# Patient Record
Sex: MALE | Race: OTHER
[De-identification: names, ages, dates, MRNs, and addresses within clinical notes are randomized per-mention and may not be internally consistent; named-entity substitution may affect disease eponyms.]

---

## 2020-07-11 ENCOUNTER — HOSPITAL ENCOUNTER (EMERGENCY)
Dept: HOSPITAL 15 - ER | Age: 35
Discharge: HOME | End: 2020-07-11
Payer: MEDICAID

## 2020-07-11 VITALS — SYSTOLIC BLOOD PRESSURE: 131 MMHG | DIASTOLIC BLOOD PRESSURE: 82 MMHG

## 2020-07-11 VITALS — WEIGHT: 180 LBS | BODY MASS INDEX: 26.66 KG/M2 | HEIGHT: 69 IN

## 2020-07-11 DIAGNOSIS — J06.9: ICD-10-CM

## 2020-07-11 DIAGNOSIS — U07.1: Primary | ICD-10-CM

## 2020-07-11 PROCEDURE — 71045 X-RAY EXAM CHEST 1 VIEW: CPT

## 2020-07-11 PROCEDURE — 87635 SARS-COV-2 COVID-19 AMP PRB: CPT

## 2021-05-03 ENCOUNTER — HOSPITAL ENCOUNTER (EMERGENCY)
Dept: HOSPITAL 26 - MED | Age: 36
Discharge: HOME | End: 2021-05-03
Payer: COMMERCIAL

## 2021-05-03 VITALS — WEIGHT: 185 LBS | HEIGHT: 68 IN | BODY MASS INDEX: 28.04 KG/M2

## 2021-05-03 VITALS — DIASTOLIC BLOOD PRESSURE: 68 MMHG | SYSTOLIC BLOOD PRESSURE: 124 MMHG

## 2021-05-03 VITALS — DIASTOLIC BLOOD PRESSURE: 89 MMHG | SYSTOLIC BLOOD PRESSURE: 114 MMHG

## 2021-05-03 DIAGNOSIS — R11.2: ICD-10-CM

## 2021-05-03 DIAGNOSIS — R19.7: ICD-10-CM

## 2021-05-03 DIAGNOSIS — F11.23: Primary | ICD-10-CM

## 2021-05-03 DIAGNOSIS — R50.9: ICD-10-CM

## 2021-05-03 DIAGNOSIS — F17.210: ICD-10-CM

## 2021-05-03 LAB
BARBITURATES UR QL SCN: NEGATIVE NG/ML
BENZODIAZ UR QL SCN: NEGATIVE NG/ML
BZE UR QL SCN: NEGATIVE NG/ML
CANNABINOIDS UR QL SCN: NEGATIVE NG/ML
OPIATES UR QL SCN: NEGATIVE NG/ML
PCP UR QL SCN: NEGATIVE NG/ML

## 2021-05-03 PROCEDURE — 96361 HYDRATE IV INFUSION ADD-ON: CPT

## 2021-05-03 PROCEDURE — 96374 THER/PROPH/DIAG INJ IV PUSH: CPT

## 2021-05-03 PROCEDURE — 80305 DRUG TEST PRSMV DIR OPT OBS: CPT

## 2021-05-03 PROCEDURE — 99283 EMERGENCY DEPT VISIT LOW MDM: CPT

## 2021-05-03 NOTE — NUR
Patient discharged with v/s stable. Written and verbal after care instructions 
given and explained. 

Patient alert, oriented and verbalized understanding of instructions. 
Ambulatory with steady gait. All questions addressed prior to discharge. ID 
band removed. Patient advised to follow up with PMD. Rx of MOTRIN, IMODIUM, 
ZOFRAN given. Patient educated on indication of medication including possible 
reaction and side effects. Opportunity to ask questions provided and answered.

## 2025-01-08 ENCOUNTER — HOSPITAL ENCOUNTER (INPATIENT)
Dept: HOSPITAL 15 - ER | Age: 40
LOS: 1 days | Discharge: HOME | DRG: 812 | End: 2025-01-09
Attending: INTERNAL MEDICINE | Admitting: INTERNAL MEDICINE
Payer: MEDICAID

## 2025-01-08 VITALS — HEIGHT: 70 IN | WEIGHT: 260.59 LBS | BODY MASS INDEX: 37.31 KG/M2

## 2025-01-08 DIAGNOSIS — B19.20: ICD-10-CM

## 2025-01-08 DIAGNOSIS — I10: ICD-10-CM

## 2025-01-08 DIAGNOSIS — Z20.822: ICD-10-CM

## 2025-01-08 DIAGNOSIS — R74.01: ICD-10-CM

## 2025-01-08 DIAGNOSIS — J96.01: ICD-10-CM

## 2025-01-08 DIAGNOSIS — E78.5: ICD-10-CM

## 2025-01-08 DIAGNOSIS — N17.0: ICD-10-CM

## 2025-01-08 DIAGNOSIS — I21.A1: ICD-10-CM

## 2025-01-08 DIAGNOSIS — K71.9: ICD-10-CM

## 2025-01-08 DIAGNOSIS — G92.8: ICD-10-CM

## 2025-01-08 DIAGNOSIS — E72.20: ICD-10-CM
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
## 2025-01-08 DIAGNOSIS — G93.89: ICD-10-CM

## 2025-01-08 DIAGNOSIS — E11.9: ICD-10-CM

## 2025-01-08 DIAGNOSIS — Z86.718: ICD-10-CM

## 2025-01-08 DIAGNOSIS — T40.411A: Primary | ICD-10-CM

## 2025-01-08 LAB
ALBUMIN SERPL-MCNC: 4.8 G/DL (ref 3.2–4.8)
ALP SERPL-CCNC: 70 U/L (ref 46–116)
ALT SERPL-CCNC: 60 U/L (ref 7–40)
ANION GAP SERPL CALCULATED.3IONS-SCNC: 9 MMOL/L (ref 5–15)
APAP SERPL-MCNC: < 2 UG/ML (ref 10–20)
BILIRUB SERPL-MCNC: 0.2 MG/DL (ref 0.2–1)
BUN SERPL-MCNC: 9 MG/DL (ref 9–23)
BUN/CREAT SERPL: 6.6 (ref 10–20)
CALCIUM SERPL-MCNC: 9.3 MG/DL (ref 8.7–10.4)
CELLS COUNTED: 100
CHLORIDE SERPL-SCNC: 103 MMOL/L (ref 98–107)
CO2 SERPL-SCNC: 25 MMOL/L (ref 20–31)
GLUCOSE SERPL-MCNC: 158 MG/DL (ref 74–106)
HCT VFR BLD AUTO: 41 % (ref 41–53)
HGB BLD-MCNC: 13.6 G/DL (ref 13.5–17.5)
MCH RBC QN AUTO: 28.8 PG (ref 28–32)
MCV RBC AUTO: 86.7 FL (ref 80–100)
POTASSIUM SERPL-SCNC: 3.9 MMOL/L (ref 3.5–5.1)
PROT SERPL-MCNC: 7.8 G/DL (ref 5.7–8.2)
SALICYLATES SERPL-MCNC: < 3 MG/DL (ref ?–30)
SODIUM SERPL-SCNC: 137 MMOL/L (ref 136–145)

## 2025-01-08 PROCEDURE — 71250 CT THORAX DX C-: CPT

## 2025-01-08 PROCEDURE — 86803 HEPATITIS C AB TEST: CPT

## 2025-01-08 PROCEDURE — 80307 DRUG TEST PRSMV CHEM ANLYZR: CPT

## 2025-01-08 PROCEDURE — 87426 SARSCOV CORONAVIRUS AG IA: CPT

## 2025-01-08 PROCEDURE — 80329 ANALGESICS NON-OPIOID 1 OR 2: CPT

## 2025-01-08 PROCEDURE — 93005 ELECTROCARDIOGRAM TRACING: CPT

## 2025-01-08 PROCEDURE — 85610 PROTHROMBIN TIME: CPT

## 2025-01-08 PROCEDURE — 80061 LIPID PANEL: CPT

## 2025-01-08 PROCEDURE — 85027 COMPLETE CBC AUTOMATED: CPT

## 2025-01-08 PROCEDURE — 83036 HEMOGLOBIN GLYCOSYLATED A1C: CPT

## 2025-01-08 PROCEDURE — 71045 X-RAY EXAM CHEST 1 VIEW: CPT

## 2025-01-08 PROCEDURE — 99291 CRITICAL CARE FIRST HOUR: CPT

## 2025-01-08 PROCEDURE — 85025 COMPLETE CBC W/AUTO DIFF WBC: CPT

## 2025-01-08 PROCEDURE — 83605 ASSAY OF LACTIC ACID: CPT

## 2025-01-08 PROCEDURE — 80320 DRUG SCREEN QUANTALCOHOLS: CPT

## 2025-01-08 PROCEDURE — 86706 HEP B SURFACE ANTIBODY: CPT

## 2025-01-08 PROCEDURE — 81001 URINALYSIS AUTO W/SCOPE: CPT

## 2025-01-08 PROCEDURE — 36415 COLL VENOUS BLD VENIPUNCTURE: CPT

## 2025-01-08 PROCEDURE — 82140 ASSAY OF AMMONIA: CPT

## 2025-01-08 PROCEDURE — 86703 HIV-1/HIV-2 1 RESULT ANTBDY: CPT

## 2025-01-08 PROCEDURE — 83880 ASSAY OF NATRIURETIC PEPTIDE: CPT

## 2025-01-08 PROCEDURE — 84484 ASSAY OF TROPONIN QUANT: CPT

## 2025-01-08 PROCEDURE — 80053 COMPREHEN METABOLIC PANEL: CPT

## 2025-01-08 PROCEDURE — 82962 GLUCOSE BLOOD TEST: CPT

## 2025-01-08 PROCEDURE — 85007 BL SMEAR W/DIFF WBC COUNT: CPT

## 2025-01-08 PROCEDURE — 86709 HEPATITIS A IGM ANTIBODY: CPT

## 2025-01-08 PROCEDURE — 96374 THER/PROPH/DIAG INJ IV PUSH: CPT

## 2025-01-08 PROCEDURE — 87340 HEPATITIS B SURFACE AG IA: CPT

## 2025-01-08 PROCEDURE — 76705 ECHO EXAM OF ABDOMEN: CPT

## 2025-01-08 PROCEDURE — 87804 INFLUENZA ASSAY W/OPTIC: CPT

## 2025-01-08 RX ADMIN — LORAZEPAM ONE MG: 2 INJECTION, SOLUTION INTRAMUSCULAR; INTRAVENOUS at 22:15

## 2025-01-08 RX ADMIN — LORAZEPAM ONE MG: 2 INJECTION, SOLUTION INTRAMUSCULAR; INTRAVENOUS at 22:17

## 2025-01-08 RX ADMIN — NALOXONE HYDROCHLORIDE ONE MG: 1 INJECTION PARENTERAL at 22:25

## 2025-01-08 NOTE — DVH
CHEST RADIOGRAPH



Indication: hypoxia s/p od



Technique: Single frontal view of the chest was obtained



Comparison: CHEST XRAY 1 VIEW on DOS: 7/11/20



FINDINGS: 



Lines and Tubes: None



Lungs: Clear



Pleura: No effusion.



No pneumothorax. 



Cardiomediastinal contours: Unremarkable



Bones: Unremarkable



IMPRESSION: 



Clear lungs. 



Electronically Signed by: Tiago Baptiste at 01/08/2025 22:47:34 PM

## 2025-01-08 NOTE — ECG
[facility]

                                       

Test Date:    2025               Test Time:    21:52:00

Pat Name:     DAVID GIRON           Department:   ER

Patient ID:   [id number]           Room:         [address] [place] [address]

Gender:       M                        Technician:   ER

:          1985               Requested By: LIZET PARK

Order Number: [id number]        Reading MD:   Baudilio Mccullough

                                 Measurements

Intervals                              Axis          

Rate:         116                      P:            68

NJ:           184                      QRS:          71

QRSD:         97                       T:            25

QT:           307                                    

QTc:          427                                    

                           Interpretive Statements

Sinus tachycardia

Ventricular premature complex

Aberrant conduction of SV complex(es)

Baseline wander in lead(s) V2

Electronically Signed On 2025 10:27:37 PST by Baudilio Mccullough



Please click the below link to view image of tracing.

## 2025-01-08 NOTE — ED.PDOC
History of Present Illness


HPI Comments


39 year old male brought in by EMS presents to the ED with a chief complaint of 

overdose onset today. Per EMS, patient was found in the bathroom floor, 

unconscious with a foil containing fentanyl. Upon EMS arrival patient was 

experiencing respiratory depression, pupils pinpoint, was given 4 mg Narcan, was

placed on 15 L non breather with O2 sat of 91%, , . Patient's 

sibling was also found unresponsive on scene. Patient is able to answer a few 

questions. Past medical history of seizures, HTN, DM, DVT. No other symptoms or 

modifying factors present at this time.  Patient denied any intent to hurt 

himself.


Chief Complaint:  Overdose


Time Seen by MD:  21:54


Primary Care Provider:  NONE


Reviewed Notes:  Nurses Notes, Medications, Allergies


Allergies:  


Coded Allergies:  


     NO KNOWN ALLERGIES (Unverified , 25)


Information Source:  Patient, Emergency Med Personnel


Mode of Arrival:  EMS


Severity:  Moderate


Timing:  Hours


Duration:  Since onset


Prehospital treatment:  Oxygen, Other (Narcan 4 mg)





Past Medical History


PAST MEDICAL HISTORY:  DM, HTN, Seizures


Past Medical History (Other):  


DVT


Surgical History:  Denies all surgeries





Social History


Smoker:  Non-Smoker


Alcohol:  Denies ETOH Use


Drugs:  Other





Neurological:  reports: others (overdose)


Unable to Obtain due to:  Altered Mental Status





Physical Exam


General Appearance:  Other (Lethargic)


HEENT:  Other (Pupils symmetric, extraocular movements intact, moist mucous 

membranes, no facial asymmetry)


Neck:  Full Range of Motion, Non-Tender, Normal Inspection, Supple


Respiratory:  Decreased Breath Sounds, No Accessory Muscle Use, No Respiratory 

Distress, Normal Breath Sounds, Other (Shallow respirations)


Cardiovascular:  No Edema, No JVD, Tachycardia


Breast Exam:  Deferred


Gastrointestinal:  Non Tender, Soft


Genitalia:  Deferred


Pelvic:  Deferred


Rectal:  Deferred


Extremities:  Normal inspection, Normal range of motion, Non-tender, No pedal 

edema


Neurologic:  Other (Lethargic, becomes agitated when alert, oriented x4 when 

alert.  Moves all extremities.  No gross focal deficit.)


Cerebellar Function:  NOT DONE


Reflexes:  NOT DONE


Skin:  Dry, Normal Color, Warm


Lymphatic:  NOT DONE





Was a procedure done?


Was a procedure done?:  No





EKG


EKG :  


Comments


Sinus tach, rate 116, normal intervals, normal axis, normal QRS, no ST/T 

changes.





Differential Dx


Considerations may include:


Opiate overdose, seizure, syncope, alcohol intoxication, other drug 

intoxication, among others





X-Ray, Labs, Meds, VS





                                   Vital Signs








  Date Time  Temp Pulse Resp B/P (MAP) Pulse Ox O2 Delivery O2 Flow Rate FiO2


 


25 00:00  120      


 


25 23:10  111 12 140/88 (105) 95   


 


25 22:58      Nasal Cannula* 5 40


 


25 22:08  124 18 140/88 (105) 95   


 


25 21:55 99.2 117 10 150/104 (119) 91   


 


25 21:52  116      








                                       Lab








Test


 25


01:03 25


22:54 25


22:10 Range/Units


 


 


Troponin I High Sensitivity 112 *H 85 *H 74 *H </=54  ng/L


 


Triglycerides Level 134    < 150  mg/dL


 


Cholesterol Level 133    < 200  mg/dL


 


LDL Cholesterol 65    < 100  mg/dL


 


HDL Cholesterol 44    40-59  mg/dL


 


White Blood Count


 


 


 15.4 H


 4.4-10.8


10^3/uL


 


Red Blood Count


 


 


 4.73 


 4.5-5.90


10^6/uL


 


Hemoglobin   13.6  13.5-17.5  g/dL


 


Hematocrit   41.0  41.0-53.0  %


 


Mean Corpuscular Volume   86.7  80.0-100.0  fL


 


Mean Corpuscular Hemoglobin   28.8  28.0-32.0  pg


 


Mean Corpuscular Hemoglobin


Concent 


 


 33.3 


 32.0-36.0  g/dL





 


Red Cell Distribution Width   14.7 H 11.8-14.3  %


 


Platelet Count


 


 


 292 


 140-450


10^3/uL


 


Mean Platelet Volume   7.7  6.9-10.8  fL


 


Neutrophils (%) (Auto)     37.0-80.0  %


 


Lymphocytes (%) (Auto)     10.0-50.0  %


 


Monocytes (%) (Auto)     0.0-12.0  %


 


Basophils (%) (Auto)     0.0-2.0  %


 


Neutrophils # (Auto)


 


 


  


 1.6-8.6  10


^3/uL


 


Lymphocytes # (Auto)


 


 


  


 0.4-5.4  10


^3/uL


 


Monocytes # (Auto)     0-1.3  10 ^3/uL


 


Differential Total Cells


Counted 


 


 100.0 


 100  





 


Neutrophils % (Manual)   82 H 37.0-80.0  


 


Band Neutrophils % (Manual)   9   


 


Lymphocytes % (Manual)   6 L 10.0-50.0  


 


Monocytes % (Manual)   3  0-12  


 


Eosinophils % (Manual)   0  0-7  


 


Basophils % (Manual)   0  0.0-2.0  


 


Metamyelocytes % (manual)   0   


 


Myelocytes % (Manual)   0   


 


Promyelocytes % (Manual)   0   


 


Blast Cells % (Manual)   0   


 


Reactive Lymphocytes   0   


 


Platelet Estimate   Adequate   


 


Sodium Level   137  136-145  mmol/L


 


Potassium Level   3.9  3.5-5.1  mmol/L


 


Chloride Level   103    mmol/L


 


Carbon Dioxide Level   25  20-31  mmol/L


 


Anion Gap   9  5-15  


 


Blood Urea Nitrogen   9  9-23  mg/dL


 


Creatinine


 


 


 1.37 H


 0.700-1.30


mg/dL


 


Glomerular Filtration Rate


Calc 


 


 68 


 >90  mL/min





 


BUN/Creatinine Ratio   6.6 L 10.0-20.0  


 


Serum Glucose   158 H   mg/dL


 


Hemoglobin A1c   6.0 H <5.7  % A1C


 


Calcium Level   9.3  8.7-10.4  mg/dL


 


Total Bilirubin   0.2  0.2-1.0  mg/dL


 


Aspartate Amino Transferase


(AST) 


 


 272 H


 13-40  U/L





 


Alanine Aminotransferase (ALT)   60 H 7-40  U/L


 


Alkaline Phosphatase   70    U/L


 


B-Type Natriuretic Peptide   15.96  0-100  pg/mL


 


Total Protein   7.8  5.7-8.2  g/dL


 


Albumin   4.8  3.2-4.8  g/dL


 


Salicylates Level   < 3.0  -30  mg/dL


 


Acetaminophen Level


 


 


 < 2.0 L


 10.0-20.0


UG/ML


 


Plasma/Serum Blood Alcohol   4.9  <10  mg/dL








                               Current Medications








 Medications


  (Trade)  Dose


 Ordered  Sig/Mayela


 Route  Start Time


 Stop Time Status Last Admin


 


 Lorazepam


  (Ativan Inj)  1 mg  ONCE  ONCE


 IV  25 22:15


 25 22:38 DC 25 22:15





 


 Sodium Chloride  1,000 ml @ 


 100 mls/hr  Q10H ONCE


 IV  25 01:00


 25 10:59  25 00:53























                             [address] [address]


                              Ph: [phone]





                               DIAGNOSTIC IMAGING


                      Diagnostic Imaging Report : [id number]


                                     Signed








PATIENT: DAVID GRION   ACCT: [id number]        UNIT: [id number]


: 1985           LOC: ER                   ROOM / BED:  / 


AGE / SEX: 39 / M         ADM STATUS: REG ER        SERVICE DT: 25





ORDERING PHYSICIAN: LIZET LEVI MD


PROCEDURE(s): CXRP - CHEST PORTABLE


REASON: hypoxia s/p od


ORDER NUMBER(s): [id number], ACCESSION NUMBER(s): [id number]








CHEST RADIOGRAPH





Indication: hypoxia s/p od





Technique: Single frontal view of the chest was obtained





Comparison: CHEST XRAY 1 VIEW on DOS: 20





FINDINGS: 





Lines and Tubes: None





Lungs: Clear





Pleura: No effusion.





No pneumothorax. 





Cardiomediastinal contours: Unremarkable





Bones: Unremarkable





IMPRESSION: 





Clear lungs. 





Electronically Signed by: Lj Martínez at 2025 22:47:34 PM











DICTATED BY: LJ MARTÍNEZ DO


DICTATED DATE/TIME: 25





SIGNED BY: LJ MARTÍNEZ DO


SIGNED DATE/TIME: 25





CC: 





X-Ray, Labs, Meds, VS Comment


39-year-old male with a history of hypertension, seizure disorder, VTE and 

diabetes brought in by EMS after being found unconscious with shallow 

respirations and pinpoint pupils.  Patient awakened with IN Narcan 

administration by EMS


Initial vitals remarkable for heart rate 116, /104, respirations 10, 

oxygen saturation 91% on 6 L mask 


Exam remarkable for lethargy, shallow respirations,  no focal neurologic deficit


Rhythm strip independently interpreted by me:  Sinus tach, rate 116, no ectopy. 




Chest x-ray unremarkable 


CBC remarkable for WBC 15.4, CMP remarkable for creatinine 1.37, mild 

transaminitis, Tylenol and salicylate levels negative, alcohol level negative, 

urine drug screen and UA pending 


Troponin elevated at 73.  BNP normal 


Patient treated with the following in the ED:


Narcan 1 mg IV for recurrent respiratory depression, Ativan 1 mg IV for 

agitation after Narcan administration


On re-evaluation, patient resting comfortably with vitals stable.  No 

respiratory depression.  Oxygen saturation normal on 6 L mask.  Patient denied 

any chest pain.  


Plan is to admit the patient for serial troponins, Cardiology evaluation, and 

respiratory support as needed.


Time of 1ST Reevaluation:  22:24


Reevaluation 1ST:  Unchanged


Patient Education/Counseling:  Diagnosis, Treatment, Prognosis


Family Education/Counseling:  No Family Present


Additional Information


I reviewed the following notes from patient's past medical encounters:





The following tests were ordered, and results were reviewed by me: EKG, TROP -

x3, CBC, CMP, DRUG SCREEN, ACETAMINOPHEN, SALICYLATE, UA, XY CHEST, BLOOD 

ALCOHOL, MANUAL DIFFERENTIAL





Additional Information was gathered from interviewing the following independent 

historians: EMS





I reviewed and agreed with the following test results read by other providers:  

XY CHEST





I discussed treatment and results with medical personnel and: patient





Departure 1


Departure


Time of Disposition:  01:33


Impression:  


   Primary Impression:  


   Accidental fentanyl overdose


   Qualified Codes:  T40.411A - Poisoning by fentanyl or fentanyl analogs, 

   accidental (unintentional), initial encounter


   Additional Impression:  


   Elevated troponin


Disposition:   ADMITTED AS INPATIENT


Admit to:  [facility]


Condition:  Guarded





Critical Care Note


Critical Care Time?:  Yes (45 min-critical care time only)


Critical care comment:


Critical care time including multiple bedside re-evaluations, review of lab and 

imaging studies, and discussion of the case with the admitting provider.  

Patient is high risk for hemodynamic and/or respiratory decompensation.





Stability


Stability form required:  No





Heart Score


Heart Score:  








Heart Score Response (Comments) Value


 


History N/A 0


 


EKG N/A 0


 


Age N/A 0


 


Risk Factors N/A 0


 


Troponin N/A 0


 


Total  0














I personally scribed for LIZET LEVI MD ([email or web address]) on 25 at 

22:19.  Electronically submitted by Sherlyn Ramirez ([email or web address]).


I personally scribed for LIZET LEVI MD ([email or web address]) on 25 at 

22:27.  Electronically submitted by Sherlyn Ramirez ([email or web address]).


I personally scribed for LIZET LEVI MD ([email or web address]) on 25 at 

23:00.  Electronically submitted by Sherlyn Ramirez ([email or web address]).





LIZET LEVI MD     2025 22:19

## 2025-01-09 VITALS — OXYGEN SATURATION: 98 % | HEART RATE: 111 BPM | RESPIRATION RATE: 22 BRPM

## 2025-01-09 VITALS
OXYGEN SATURATION: 94 % | RESPIRATION RATE: 14 BRPM | TEMPERATURE: 98.1 F | HEART RATE: 98 BPM | DIASTOLIC BLOOD PRESSURE: 82 MMHG | SYSTOLIC BLOOD PRESSURE: 122 MMHG

## 2025-01-09 LAB
ALBUMIN SERPL-MCNC: 4.7 G/DL (ref 3.2–4.8)
ALP SERPL-CCNC: 64 U/L (ref 46–116)
ALT SERPL-CCNC: 193 U/L (ref 7–40)
ANION GAP SERPL CALCULATED.3IONS-SCNC: 9 MMOL/L (ref 5–15)
BILIRUB SERPL-MCNC: 0.5 MG/DL (ref 0.2–1)
BUN SERPL-MCNC: 11 MG/DL (ref 9–23)
BUN/CREAT SERPL: 9.7 (ref 10–20)
CALCIUM SERPL-MCNC: 9.6 MG/DL (ref 8.7–10.4)
CHLORIDE SERPL-SCNC: 101 MMOL/L (ref 98–107)
CHOLEST SERPL-MCNC: 133 MG/DL (ref ?–200)
CO2 SERPL-SCNC: 26 MMOL/L (ref 20–31)
GLUCOSE SERPL-MCNC: 100 MG/DL (ref 74–106)
HCT VFR BLD AUTO: 39 % (ref 41–53)
HDLC SERPL-MCNC: 44 MG/DL (ref 40–59)
HGB BLD-MCNC: 13.3 G/DL (ref 13.5–17.5)
INR PPP: 0.97 (ref 0.9–1.15)
MCH RBC QN AUTO: 29.4 PG (ref 28–32)
MCV RBC AUTO: 86.2 FL (ref 80–100)
NRBC BLD QL AUTO: 0 %
POTASSIUM SERPL-SCNC: 4.3 MMOL/L (ref 3.5–5.1)
PROT SERPL-MCNC: 7.5 G/DL (ref 5.7–8.2)
PROT UR STRIP.AUTO-MCNC: (no result) MG/DL
PROTHROMBIN TIME: 10.3 SEC (ref 9.3–11.8)
SARS-COV+SARS-COV-2 AG RESP QL IA.RAPID: NEGATIVE
SODIUM SERPL-SCNC: 136 MMOL/L (ref 136–145)
TRIGL SERPL-MCNC: 134 MG/DL (ref ?–150)

## 2025-01-09 RX ADMIN — HUMAN INSULIN SCH UNITS: 100 INJECTION, SOLUTION SUBCUTANEOUS at 07:00

## 2025-01-09 RX ADMIN — KETOROLAC TROMETHAMINE ONE MG: 30 INJECTION, SOLUTION INTRAMUSCULAR; INTRAVENOUS at 11:53

## 2025-01-09 RX ADMIN — PIPERACILLIN SODIUM AND TAZOBACTAM SODIUM SCH MLS/HR: .375; 3 INJECTION, POWDER, LYOPHILIZED, FOR SOLUTION INTRAVENOUS at 03:05

## 2025-01-09 RX ADMIN — APIXABAN SCH MG: 5 TABLET, FILM COATED ORAL at 09:59

## 2025-01-09 RX ADMIN — Medication SCH STRIP: at 07:08

## 2025-01-09 RX ADMIN — NALOXONE HYDROCHLORIDE ONE MG: 1 INJECTION PARENTERAL at 07:13

## 2025-01-09 RX ADMIN — HYDROCODONE BITARTRATE AND ACETAMINOPHEN PRN TAB: 5; 325 TABLET ORAL at 06:07

## 2025-01-09 RX ADMIN — SODIUM CHLORIDE ONE MLS/HR: 0.9 INJECTION, SOLUTION INTRAVENOUS at 00:53

## 2025-01-09 NOTE — DVHHPRES
History of Present Illness


Resident Creating Document:  TAJ IVORY


History of Present Illness


This is a 39-year-old male with past medical history of seizures, hypertension, 

dyslipidemia, type 2 diabetes mellitus, history of DVT (on Eliquis) who 

presented to the ED due to drug overdose.  Per EMS, the patient was found 

unconscious in the floor of the bathroom with fentanyl and his hand.  In the 

route to the hospital, the patient was having respiratory depression, miotic 

nonreactive pupils paramedics gave 4 mg of Narcan before region to the ED. upon 

admission, the patient received an additional1 mg of Narcan, was placed on15 L 

of oxygen on non-rebreather mask saturating 91%.  Initial labs showed 

leukocytosis at 15.4, BUN and creatinine were nine and 1.37 respectively.  Liver

enzymes OVQ596 and ALT 60.  Troponins came back slightly positive 85.  EKG was 

consistent with sinus tachycardia with no ST segment elevation or depression at 

this time.  BNP was normal range and initial chest x-ray was grossly 

unremarkable with slight infiltrates on right lung base.  We will admit the 

patient for further assessment and management.


Cardiovascular:  HTN, hyperipidemia


CNS:  Seizure


Endocrine:  Diabetes


Past Surgical History:  None


Family History:  None


Smoke:  No


ALCOHOL:  rare


Drugs:  None


Lives:  with Family


Domestic Violence:  Neg





Review of Systems


Constitutional:  No: Fever, Chills, Sweats, Weakness, Malaise, Other


Eyes:  Other (Bilateral pupils are miotic slightly reactive to light); No: Pain,

Vision change, Conjunctivae inflammation, Eyelid inflammation, Redness


ENT:  No: Ear pain, Ear discharge, Nose pain, Nose discharge, Nose congestion, 

Mouth pain, Mouth swelling, Throat pain, Throat swelling, Other


Respiratory:  Shortness of breath; No: Cough, Dry, SOB with excertion, Wheezing,

Hemoptysis, Pleuritic Pain, Sputum, Wheezing, Other


Cardiovascular:  No: Chest Pain, Palpitations, Orthopnea, Paroxysmal Noc. 

Dyspnea, Edema, Lt Headedness, Other


Gastrointestinal:  No: Nausea, Vomiting, Abdominal Pain, Diarrhea, Constipation,

Melena, Hematochezia, Other


Genitourinary:  No Dysuria, No Frequency, No Incontinence, No Hematuria, No 

Retention, No Other


Musculoskeletal:  No: other, neck pain, shoulder pain, arm pain, back pain, hand

pain, leg pain, foot pain


Skin:  No: Rash, Lesions, Jaundice, Bruising, Other


Neurological:  Confusion; No: Weakness, Numbness, Incoordination, Change in 

speech, Seizures, Other


Other


Ms. Drowsy unable to provide a clear review of system.


Allergies:  


Coded Allergies:  


     NO KNOWN ALLERGIES (Unverified , 1/9/25)





Exam


Vital Signs





Vital Signs








  Date Time  Temp Pulse Resp B/P (MAP) Pulse Ox O2 Delivery O2 Flow Rate FiO2


 


1/9/25 00:00  120      


 


1/8/25 23:10   12 140/88 (105) 95   


 


1/8/25 22:58      Nasal Cannula* 5 40


 


1/8/25 21:55 99.2       








General Appearance:  Alert (Patient is alert but drowsy unable to provide a 

clear history but answers simple questions appropriately), Other


HEENT:  Atraumatic, PERRLA, EOMI, Mucous membr. moist/pink, Other (Bilateral 

pupils are miotic slightly reactive to light)


Respiratory:  Normal air movement, Other (Left lung is grossly clear but there 

are minimal crackles on right lung base.  No wheezes at this time)


Cardiovascular:  Regular rate, Normal S1, Normal S2, No murmurs, Other 

(Currently on sinus tachycardia)


Abdominal:  Normal bowel sounds, Soft, No tenderness, No hepatospenomegaly, No 

masses


Extremities:  No clubbing, No cyanosis, No edema, Normal pulses, No 

tenderness/swelling


Skin:  No rashes, No breakdown, No significant lesion


Neuro:  Normal gait, Other (Patient has slurred speech due to drowsiness)


Psych/Mental Status:  Mental status NL, Mood NL





Labs/Xrays





                                      Labs








Test


 1/9/25


01:03 1/8/25


22:10 Range/Units


 


 


White Blood Count


 


 15.4 H


 4.4-10.8


10^3/uL


 


Red Blood Count


 


 4.73 


 4.5-5.90


10^6/uL


 


Hemoglobin  13.6  13.5-17.5  g/dL


 


Hematocrit  41.0  41.0-53.0  %


 


Mean Corpuscular Volume  86.7  80.0-100.0  fL


 


Mean Corpuscular Hemoglobin  28.8  28.0-32.0  pg


 


Mean Corpuscular Hemoglobin


Concent 


 33.3 


 32.0-36.0  g/dL





 


Red Cell Distribution Width  14.7 H 11.8-14.3  %


 


Platelet Count


 


 292 


 140-450


10^3/uL


 


Mean Platelet Volume  7.7  6.9-10.8  fL


 


Neutrophils (%) (Auto)    37.0-80.0  %


 


Lymphocytes (%) (Auto)    10.0-50.0  %


 


Monocytes (%) (Auto)    0.0-12.0  %


 


Basophils (%) (Auto)    0.0-2.0  %


 


Neutrophils # (Auto)


 


  


 1.6-8.6  10


^3/uL


 


Lymphocytes # (Auto)


 


  


 0.4-5.4  10


^3/uL


 


Monocytes # (Auto)    0-1.3  10 ^3/uL


 


Differential Total Cells


Counted 


 100.0 


 100  





 


Neutrophils % (Manual)  82 H 37.0-80.0  


 


Band Neutrophils % (Manual)  9   


 


Lymphocytes % (Manual)  6 L 10.0-50.0  


 


Monocytes % (Manual)  3  0-12  


 


Eosinophils % (Manual)  0  0-7  


 


Basophils % (Manual)  0  0.0-2.0  


 


Metamyelocytes % (manual)  0   


 


Myelocytes % (Manual)  0   


 


Promyelocytes % (Manual)  0   


 


Blast Cells % (Manual)  0   


 


Reactive Lymphocytes  0   


 


Platelet Estimate  Adequate   


 


Sodium Level  137  136-145  mmol/L


 


Potassium Level  3.9  3.5-5.1  mmol/L


 


Chloride Level  103    mmol/L


 


Carbon Dioxide Level  25  20-31  mmol/L


 


Anion Gap  9  5-15  


 


Blood Urea Nitrogen  9  9-23  mg/dL


 


Creatinine


 


 1.37 H


 0.700-1.30


mg/dL


 


Glomerular Filtration Rate


Calc 


 68 


 >90  mL/min





 


BUN/Creatinine Ratio  6.6 L 10.0-20.0  


 


Serum Glucose  158 H   mg/dL


 


Calcium Level  9.3  8.7-10.4  mg/dL


 


Total Bilirubin  0.2  0.2-1.0  mg/dL


 


Aspartate Amino Transferase


(AST) 


 272 H


 13-40  U/L





 


Alanine Aminotransferase (ALT)  60 H 7-40  U/L


 


Alkaline Phosphatase  70    U/L


 


B-Type Natriuretic Peptide  15.96  0-100  pg/mL


 


Total Protein  7.8  5.7-8.2  g/dL


 


Albumin  4.8  3.2-4.8  g/dL


 


Salicylates Level  < 3.0  -30  mg/dL


 


Acetaminophen Level


 


 < 2.0 L


 10.0-20.0


UG/ML


 


Plasma/Serum Blood Alcohol  4.9  <10  mg/dL











Assessment/Plan





Acute metabolic/toxic encephalopathy likely due to Fentanyl overdose


Hyperammonemia


-Patient was found unconscious by EMS with Fentanyl


-A total of 5mg of Naloxone was given between paramedics and ED


-Currently on nasal cannula at 5L of O2


-Start IV fluids 0.9% at 100cc/hr 


-Start lactulose 30cc daily


-Ordered UDS, U/A


-Ordered lactic acid


-Ordered Covid-19 and Influenza A and B test





Possible Aspiration pneumonia


-Order CT of the chest to R/O aspiration pneumonia


-Currently on 6L of O2, sat 95%


-There are right lower lobe crackles


-Start IV Zosyn





KRISTINA likely de to vasomotor nephropathy


-creatinine 1.37, BUN 9


-start IV fluids at 100 cc/hour


-monitor kidney function





NSTEMI likely Type II


-Trops came back slight elevated 74-85


-Trend trops


-EKG was showing sinus tachycardia with no ST segment elevation





Acute transaminitis in the setting of Drug induced liver injury


-, ALT 60


-Monitor Liver enzymes





Primary hypertension


-Amlodipine 10mg daily


-Monitor BP closely





Type 2 Diabetes Mellitus


-, Ordered HbA1c


-Start mild sliding scale insulin





Hx of Seizure


-Patient was on keppra at home, waiting to reconcile meds with family





Dyslipidemia


-Order lipid panel





Hx of DVTs on Eliquis at home


-Resume home apixaban 5mg BID





Goals of care discussed with patient at bedside, FULL CODE


Plan discussed with Dr. Sandoval


Plan discussed with:  Patient


My Orders





                     Orders - TAJ IVORY RESIDENT








Procedure Category Date Status





  Time 


 


Urinalysis LAB 1/9/25 Logged





  00:45 


 


Drug Screen LAB 1/9/25 Logged





  00:45 


 


Sodium Chloride 0.9% PHA 1/9/25 In Process





  01:00 


 


Admit ADMIT 1/9/25 Verified





  01:10 


 


Code Status CODE 1/9/25 Verified





  01:10 


 


Vital Signs [facility] 1/9/25 Verified





  01:10 


 


Review Orders With HUMBLE 1/9/25 Verified





  01:10 


 


Encourage Activity As HUMBLE 1/9/25 Verified





Tolerate  01:10 


 


Regular Diet DIET 1/9/25 Verified





  Breakfast 


 


Notify Md Of Changes [facility] 1/9/25 Verified





From Base  01:10 


 


Advance Directive [facility] 1/9/25 Verified





  01:10 


 


Urinalysis LAB 1/9/25 Verified





  01:10 











Date of Service:  Jan 9, 2025


Billing Provider:  BERTHA SANDOVAL MD


Common Visit Codes:  99223-INITIAL  INP/OBS CARE (HIGH)











AL IVORYE RESIDENT  Jan 9, 2025 02:16


BERTHA SANDOVAL MD             Jan 9, 2025 17:46

## 2025-01-09 NOTE — DVHDSRES
Discharge Summary


Date of Admission


Resident Creating Document:  BALJEET MOCK RESIDENT


2025 at 01:10





Date of Discharge:


2025





Admitting Diagnosis


Acute metabolic/toxic encephalopathy likely due to Fentanyl overdose





Labs/Diagnostic Data:





                               Laboratory Results








Test


 25


07:03 25


06:30 25


06:20 25


02:18


 


POC Glucose


 112 mg/dl


() 


 


 





 


Urine Color


 


 Yellow


(Yellow) 


 





 


Urine Clarity  Hazy (Clear)   


 


Urine pH  6.0 (5.0-9.0)   


 


Urine Specific Gravity


 


 1.016


(1.001-1.035) 


 





 


Urine Protein  2+ (Negative)   


 


Urine Ketones


 


 Negative


(Negative) 


 





 


Urine Blood


 


 3+ /uL


(Negative) 


 





 


Urine Nitrite


 


 Negative


(Negative) 


 





 


Urine Bilirubin


 


 Negative


(Negative) 


 





 


Urine Urobilinogen


 


 Normal mg/dL


(Negative) 


 





 


Urine Leukocyte Esterase


 


 Negative /uL


(Negative) 


 





 


Urine RBC  2 /hpf (0 - 3)   


 


Urine WBC  7 /hpf (0 - 3)   


 


Urine Squamous Epithelial


Cells 


 None seen /hpf


(<5) 


 





 


Urine Bacteria


 


 Few /hpf (None


Seen) 


 





 


Urine Hyaline Casts


 


 Few /lpf (0 -


2) 


 





 


Urine Glucose


 


 Normal mg/dL


(Normal) 


 





 


Urine Opiates Screen  Neg (NEGATIVE)   


 


Urine Fentanyl Screen  Pos (NEGATIVE)   


 


Urine Barbiturates Screen  Neg (NEGATIVE)   


 


Urine Phencyclidine Screen  Neg (NEGATIVE)   


 


Urine Amphetamines Screen  Neg (NEGATIVE)   


 


Urine Benzodiazepines Screen  Neg (NEGATIVE)   


 


Urine Cocaine Screen  Neg (NEGATIVE)   


 


Urine Cannabinoids Screen  Neg (NEGATIVE)   


 


White Blood Count


 


 


 13.0 10^3/uL


(4.4-10.8) 





 


Red Blood Count


 


 


 4.53 10^6/uL


(4.5-5.90) 





 


Hemoglobin


 


 


 13.3 g/dL


(13.5-17.5) 





 


Hematocrit


 


 


 39.0 %


(41.0-53.0) 





 


Mean Corpuscular Volume


 


 


 86.2 fL


(80.0-100.0) 





 


Mean Corpuscular Hemoglobin


 


 


 29.4 pg


(28.0-32.0) 





 


Mean Corpuscular Hemoglobin


Concent 


 


 34.1 g/dL


(32.0-36.0) 





 


Red Cell Distribution Width


 


 


 15.0 %


(11.8-14.3) 





 


Platelet Count


 


 


 243 10^3/uL


(140-450) 





 


Mean Platelet Volume


 


 


 7.8 fL


(6.9-10.8) 





 


Neutrophils (%) (Auto)


 


 


 78.4 %


(37.0-80.0) 





 


Lymphocytes (%) (Auto)


 


 


 12.5 %


(10.0-50.0) 





 


Monocytes (%) (Auto)


 


 


 8.7 %


(0.0-12.0) 





 


Eosinophils (%) (Auto)


 


 


 0.2 %


(0.0-7.0) 





 


Basophils (%) (Auto)


 


 


 0.2 %


(0.0-2.0) 





 


Neutrophils # (Auto)


 


 


 10.2 10 ^3/uL


(1.6-8.6) 





 


Lymphocytes # (Auto)


 


 


 1.6 10 ^3/uL


(0.4-5.4) 





 


Monocytes # (Auto)


 


 


 1.1 10 ^3/uL


(0-1.3) 





 


Eosinophils # (Auto)


 


 


 0 10 ^3/uL


(0-0.8) 





 


Basophils # (Auto)


 


 


 0 10 ^3/uL


(0-0.2) 





 


Nucleated Red Blood Cells   0.0 %  


 


Prothrombin Time


 


 


 10.3 sec


(9.3-11.8) 





 


Prothrombin Time INR


 


 


 0.97


(0.9-1.15) 





 


Sodium Level


 


 


 136 mmol/L


(136-145) 





 


Potassium Level


 


 


 4.3 mmol/L


(3.5-5.1) 





 


Chloride Level


 


 


 101 mmol/L


() 





 


Carbon Dioxide Level


 


 


 26 mmol/L


(20-31) 





 


Anion Gap   9 (5-15)  


 


Blood Urea Nitrogen


 


 


 11 mg/dL


(9-23) 





 


Creatinine


 


 


 1.13 mg/dL


(0.700-1.30) 





 


Glomerular Filtration Rate


Calc 


 


 85 mL/min


(>90) 





 


BUN/Creatinine Ratio


 


 


 9.7


(10.0-20.0) 





 


Serum Glucose


 


 


 100 mg/dL


() 





 


Calcium Level


 


 


 9.6 mg/dL


(8.7-10.4) 





 


Total Bilirubin


 


 


 0.5 mg/dL


(0.2-1.0) 





 


Aspartate Amino Transferase


(AST) 


 


 1116 U/L


(13-40) 





 


Alanine Aminotransferase (ALT)   193 U/L (7-40)  


 


Alkaline Phosphatase


 


 


 64 U/L


() 





 


Total Protein


 


 


 7.5 g/dL


(5.7-8.2) 





 


Albumin


 


 


 4.7 g/dL


(3.2-4.8) 





 


Hepatitis A IgM Antibody   Negative  


 


Hepatitis B Surface Antigen


 


 


 Negative


(Negative) 





 


Hepatitis B Surface Antibody


 


 


 Positive


(Negative) 





 


Hepatitis C Antibody


 


 


 Reactive


(Negative) 





 


Influenza Type A Antigen


 


 


 


 Negative


(Negative)


 


Influenza Type B Antigen


 


 


 


 Negative


(Negative)


 


SARS-CoV-2 Antigen (Rapid)


 


 


 


 Negative


(NEGATIVE)


 


Test


 25


01:40 25


01:03 25


22:10 





 


Lactic Acid Level


 1.2 mmol/L


(0.4-2.0) 


 


 





 


Ammonia


 34 umol/L


(11-32) 


 


 





 


Troponin I High Sensitivity


 


 112 ng/L


(</=54) 


 





 


Triglycerides Level


 


 134 mg/dL (<


150) 


 





 


Cholesterol Level


 


 133 mg/dL (<


200) 


 





 


LDL Cholesterol


 


 65 mg/dL (<


100) 


 





 


HDL Cholesterol


 


 44 mg/dL


(40-59) 


 





 


HIV (1&2) Antibody


 


 Negative


(Negative) 


 





 


Differential Total Cells


Counted 


 


 100.0 (100) 


 





 


Neutrophils % (Manual)   82 (37.0-80.0)  


 


Band Neutrophils % (Manual)   9  


 


Lymphocytes % (Manual)   6 (10.0-50.0)  


 


Monocytes % (Manual)   3 (0-12)  


 


Eosinophils % (Manual)   0 (0-7)  


 


Basophils % (Manual)   0 (0.0-2.0)  


 


Metamyelocytes % (manual)   0  


 


Myelocytes % (Manual)   0  


 


Promyelocytes % (Manual)   0  


 


Blast Cells % (Manual)   0  


 


Reactive Lymphocytes   0  


 


Platelet Estimate   Adequate  


 


Hemoglobin A1c


 


 


 6.0 % A1C


(<5.7) 





 


B-Type Natriuretic Peptide


 


 


 15.96 pg/mL


(0-100) 





 


Salicylates Level


 


 


 < 3.0 mg/dL


(-30) 





 


Acetaminophen Level


 


 


 < 2.0 UG/ML


(10.0-20.0) 





 


Plasma/Serum Blood Alcohol


 


 


 4.9 mg/dL


(<10) 








                             Other Laboratory Tests


25 06:20











Brief Hx & Hospital Course:


Reason for Admission:


Acute drug overdose with respiratory depression and altered mental status.





Hospital Course:


This is a 39-year-old male with a past medical history of seizures, 

hypertension, dyslipidemia, type 2 diabetes mellitus, and DVT (on Eliquis), who 

was brought to the emergency department after being found unconscious on the 

floor of a bathroom due to a suspected drug overdose. [facility]

 administered 4 mg of Narcan en route, followed by an additional 1 mg in the ED,

resulting in partial reversal of symptoms.


Initial vitals revealed respiratory depression and nonreactive miotic pupils. 

The patient was placed on 15 L of oxygen via a non-rebreather mask. Lab results 

were significant for Leukocytosis,transaminitis with AST > 1000, troponins 

slightly positive at 85


EKG showed sinus tachycardia without ST segment elevation or depression. Initial

chest X-ray was unremarkable, except for mild right lung base infiltrates.


Further testing revealed the patient was positive for hepatitis C antibody. 

Public health authorities were notified regarding this finding. The patient 

received supportive care, oxygen therapy, and ongoing monitoring. He improved 

clinically during hospitalization and remained stable.





Discharge Instructions:


  - The patient should follow up with his PCP within 1 week to discuss 

outpatient management, including:


  - Further evaluation of hepatitis C antibody positivity.





Follow-Up:


  PCP appointment: Schedule within 1 to 2 weeks.


  Public Health Notification: Hepatitis C status was reported.





Case discussed with 


Goals of care discussed with the patient for 28 minutes.





Operations or Procedures


                             [place] [address]


                              Ph: [phone]





                               DIAGNOSTIC IMAGING


                      Diagnostic Imaging Report : [id number]


                                     Signed








PATIENT: DAVID GIRON   ACCT: [id number]        UNIT: [id number]


: 1985           LOC: ER                   ROOM / BED:  / 


AGE / SEX: 39 / M         ADM STATUS: REG ER        SERVICE DT: 25





ORDERING PHYSICIAN: LIZET LEVI MD


PROCEDURE(s): CXRP - CHEST PORTABLE


REASON: hypoxia s/p od


ORDER NUMBER(s): [id number], ACCESSION NUMBER(s): [id number]








CHEST RADIOGRAPH





Indication: hypoxia s/p od





Technique: Single frontal view of the chest was obtained





Comparison: CHEST XRAY 1 VIEW on DOS: 20





FINDINGS: 





Lines and Tubes: None





Lungs: Clear





Pleura: No effusion.





No pneumothorax. 





Cardiomediastinal contours: Unremarkable





Bones: Unremarkable





IMPRESSION: 





Clear lungs. 





Electronically Signed by: Lj Martínez at 2025 22:47:34 PM











DICTATED BY: LJ MARTÍNEZ DO


DICTATED DATE/TIME: 25





SIGNED BY: LJ MARTÍNEZ DO


SIGNED DATE/TIME: 25





CC: 


                             [place] [address]


                              Ph: [phone]





                               DIAGNOSTIC IMAGING


                      Diagnostic Imaging Report : [id number]


                                     Signed








PATIENT: JEREMY GIRONMY   ACCT: [id number]        UNIT: [id number]


: 1985           LOC: TELE                 ROOM / BED: 1009-ERT / A


AGE / SEX: 39 / M         ADM STATUS: ADM IN        SERVICE DT: 25





ORDERING PHYSICIAN: TAJ IVORY


PROCEDURE(s): CX2CT - CHEST WITHOUT CONTRAST


REASON: R/O aspiration pneumonia


ORDER NUMBER(s): [id number], ACCESSION NUMBER(s): [id number]








Procedure: CT CHEST WITHOUT CONTRAST





Reason for study/Clinical History: R/O aspiration pneumonia





Comparison Study: Chest radiograph dated 2025.





Exam Date: 2025 02:48 AM





TECHNIQUE: Multidetector CT of the chest was performed from the lung apices to 

the upper abdomen without the use of intravenous contract. Axial, coronal and 

sagittal multiplanar reformats were performed.





Radiation Dose Information:





CT Dose: CTDI volume is 21.55 mGy. Dose-length product is 806.63 mGy*cm





The dose indicators for CT are the volume Computed Tomography (CT) Dose Index 

(CTDIvol) and the Dose Length Product (DLP), and are measured in units of mGy 

and mGy-cm, respectively. These indicators are not patient dose, but values 

generated from the CT scanner acquisition factors. The report includes radiation

exposure data for exposures received during this examination. 





FINDINGS: 





Lower neck: Normal thyroid.





Lungs: Scattered opacities in the lower lobes.





Pleura: No pleural effusion or significant pneumothorax.





Central airways: Patent.





Heart/Vascular Structures: Normal heart size. No pericardial effusion. Normal 

caliber thoracic aorta and main pulmonary artery.





Lymph Nodes: No adenopathy





Musculoskeletal: No acute osseous abnormality.





Soft tissues: Normal.





Upper abdomen: Limited portions of the upper abdomen are unremarkable.





IMPRESSION: 





1. Bilateral lower lobe opacities which may reflect infectious or inflammatory 

etiology.  





Radiation optimization: All CT scans at this facility use at least one of these 

dose optimization techniques: automated exposure control  mA and/or kV 

adjustment per patient size (includes targeted exams where dose is matched to 

clinical indication)  or iterative reconstruction.





Electronically Signed by: Nathaniel Pastor at 2025 04:08:57 AM











DICTATED BY: NATHANIEL PASTOR MD


DICTATED DATE/TIME: 25 040





SIGNED BY: NATHANIEL PASTOR MD


SIGNED DATE/TIME: 25





CC: 


                             [place] [address]


                              Ph: [phone]





                               DIAGNOSTIC IMAGING


                      Diagnostic Imaging Report : [id number]


                                     Signed








PATIENT: DAVID GIRON   ACCT: [id number]        UNIT: [id number]


: 1985           LOC: TELE                 ROOM / BED: [address] [place] [address] / 


AGE / SEX: 39 / M         ADM STATUS: ADM IN        SERVICE DT: 25





ORDERING PHYSICIAN: BALJEET MOCK


PROCEDURE(s): LIVUS - LIVER


REASON: elevated liver enzymes/ rule out structural abdnormalities


ORDER NUMBER(s): [id number], ACCESSION NUMBER(s): [id number]








INDICATION: elevated liver enzymes/ rule out structural abdnormalities





TECHNIQUE:  Multiple real-time sonographic images were obtained of the right 

upper quadrant.





COMPARISON: None





FINDINGS: The liver demonstrates homogenous echotexture without focal mass 

lesions. The liver measures   16cm. There is no intrahepatic or extrahepatic 

ductal dilatation.  The common duct measures  0.4 mm. 





The gallbladder is without evidence of stone or sludge.  The gallbladder wall 

measures  0.2 mm and is within normal limits.  





The right kidney measures  11 cm.  The right kidney is normal in contour, size, 

and shape.  The echogenicity is normal.  There is no hydronephrosis.





The pancreas is not well visualized due to overlying bowel gas.





IMPRESSION: 





No sonographic evidence of gallstones or acute cholecystitis.





Electronically Signed by: Alyssa Gavin at 2025 09:53:45 AM











DICTATED BY: ALYSSA GAVIN MD


DICTATED DATE/TIME: 25





SIGNED BY: ALYSSA GAVIN MD


SIGNED DATE/TIME: 25





CC:





Condition at Discharge:


Fair





Final Diagnosis/Problems List





Acute metabolic/toxic encephalopathy likely due to Fentanyl overdose


Acute hypoxic respiratory failure due to respiratory depression due to


fentanyl overdose


Hyperammonemia


Transiminitis likely due to Hep C infection


ruled out Aspiration pneumonia


stress induced leukocytosis


KRISTINA likely de to vasomotor nephropathy


asymtomatic bacteriuria





NSTEMI likely Type II


Acute transaminitis in the setting of Drug induced liver injury


Primary hypertension


Type 2 Diabetes Mellitus


Hx of Seizure


Dyslipidemia





lower back pain likely due to trauma





Possible Aspiration pneumonia





Acute transaminitis, likely due to drug overdose





KRISTINA likely de to vasomotor nephropathy





NSTEMI likely Type II





Acute transaminitis in the setting of Drug induced liver injury





Primary hypertension





Type 2 Diabetes Mellitus





Hx of Seizure





Dyslipidemia





Discharge Disposition:


Home





SNF Discharge


Will this Physician continue t:  No





Discharge Instruct/Medications


Diet:  Regular


Activity:  No Restrictions, As Tolerated


Follow Up/Referral:  


Follow-up with primary care Doctor within 1-2 weeks


Medications:  


Script to Pharmacy


Discharge Statement:


"Patient was advised to return to the ER or call 911 if any headaches, 

dizziness, shortness of breath, chest pain, abdominal pain, bleeding, fevers, or

worsening of medical condition.





Patient was counseled about treatment plan, medications, possible side effects, 

patientverbalized understanding. All questions were answered to the best of my 

ability.





This discharge took greater then 30 minutes in planning, reviewing 

documentation, counseling the patient, and discussing with other team members."





ASSESSMENT


Acute metabolic/toxic encephalopathy likely due to Fentanyl overdose


Acute hypoxemic respiratory failure likely due to respiratory depression due to 

fentanyl overdose


lower back pain likely due to trauma


Possible Aspiration pneumonia


Acute transaminitis, likely due to drug overdose


KRISTINA likely de to vasomotor nephropathy


NSTEMI likely Type II


Acute transaminitis in the setting of Drug induced liver injury


Primary hypertension


Type 2 Diabetes Mellitus


Hx of Seizure


Dyslipidemia





Date of Service:  2025


Billing Provider:  SEAMUS VILLEGAS MD


Common Visit Codes:  48818-KFL/OBS DISCH DAY >30min











BALJEET MOCK RESIDENT       2025 21:18


SEAMUS VILLEGAS MD                 Darren 10, 2025 15:10

## 2025-01-09 NOTE — [facility]H
Procedure: CT CHEST WITHOUT CONTRAST



Reason for study/Clinical History: R/O aspiration pneumonia



Comparison Study: Chest radiograph dated 01/08/2025.



Exam Date: 01/09/2025 02:48 AM



TECHNIQUE: Multidetector CT of the chest was performed from the lung apices to the upper abdomen with
out the use of intravenous contract. Axial, coronal and sagittal multiplanar reformats were performed
.



Radiation Dose Information:



CT Dose: CTDI volume is 21.55 mGy. Dose-length product is 806.63 mGy*cm



The dose indicators for CT are the volume Computed Tomography (CT) Dose Index (CTDIvol) and the Dose 
Length Product (DLP), and are measured in units of mGy and mGy-cm, respectively. These indicators are
 not patient dose, but values generated from the CT scanner acquisition factors. The report includes 
radiation exposure data for exposures received during this examination. 



FINDINGS: 



Lower neck: Normal thyroid.



Lungs: Scattered opacities in the lower lobes.



Pleura: No pleural effusion or significant pneumothorax.



Central airways: Patent.



Heart/Vascular Structures: Normal heart size. No pericardial effusion. Normal caliber thoracic aorta 
and main pulmonary artery.



Lymph Nodes: No adenopathy



Musculoskeletal: No acute osseous abnormality.



Soft tissues: Normal.



Upper abdomen: Limited portions of the upper abdomen are unremarkable.



IMPRESSION: 



1. Bilateral lower lobe opacities which may reflect infectious or inflammatory etiology.  



Radiation optimization: All CT scans at this facility use at least one of these dose optimization kierra
hniques: automated exposure control  mA and/or kV adjustment per patient size (includes targeted exam
s where dose is matched to clinical indication)  or iterative reconstruction.



Electronically Signed by: Rona Garcia at 01/09/2025 04:08:57 AM

## 2025-01-09 NOTE — DVH
INDICATION: elevated liver enzymes/ rule out structural abdnormalities



TECHNIQUE:  Multiple real-time sonographic images were obtained of the right upper quadrant.



COMPARISON: None



FINDINGS: The liver demonstrates homogenous echotexture without focal mass lesions. The liver measure
s   16cm. There is no intrahepatic or extrahepatic ductal dilatation.  The common duct measures  0.4 
mm. 



The gallbladder is without evidence of stone or sludge.  The gallbladder wall measures  0.2 mm and is
 within normal limits.  



The right kidney measures  11 cm.  The right kidney is normal in contour, size, and shape.  The echog
enicity is normal.  There is no hydronephrosis.



The pancreas is not well visualized due to overlying bowel gas.



IMPRESSION: 



No sonographic evidence of gallstones or acute cholecystitis.



Electronically Signed by: Miles Prescott at 01/09/2025 09:53:45 AM